# Patient Record
Sex: FEMALE | URBAN - METROPOLITAN AREA
[De-identification: names, ages, dates, MRNs, and addresses within clinical notes are randomized per-mention and may not be internally consistent; named-entity substitution may affect disease eponyms.]

---

## 2017-01-15 ENCOUNTER — TELEPHONE (OUTPATIENT)
Dept: OBSTETRICS AND GYNECOLOGY | Facility: HOSPITAL | Age: 57
End: 2017-01-15

## 2017-01-16 NOTE — TELEPHONE ENCOUNTER
Please advise bmd shows osteoporosis--she is at increased risk of fracture if she falls; needs medication to slow loss in addition to calcium 1500mg/d with source of vitamin d;   Does she have any problems with reflux?

## 2017-01-18 ENCOUNTER — TELEPHONE (OUTPATIENT)
Dept: OBSTETRICS AND GYNECOLOGY | Facility: CLINIC | Age: 57
End: 2017-01-18

## 2017-01-18 NOTE — TELEPHONE ENCOUNTER
Pt states she will increase the Vit D 1000 she is currently taking to 1500 and she is on pantoprazole for reflux. VANNA Pang

## 2017-01-18 NOTE — TELEPHONE ENCOUNTER
----- Message from Tez Hollingsworth sent at 1/18/2017  3:14 PM CST -----  The patient recently missed a call from the staff of Dr Moody the patient can be reached at [phone].    Thanks